# Patient Record
Sex: MALE | Race: WHITE | NOT HISPANIC OR LATINO
[De-identification: names, ages, dates, MRNs, and addresses within clinical notes are randomized per-mention and may not be internally consistent; named-entity substitution may affect disease eponyms.]

---

## 2019-12-19 ENCOUNTER — APPOINTMENT (OUTPATIENT)
Dept: OTOLARYNGOLOGY | Facility: CLINIC | Age: 64
End: 2019-12-19
Payer: MEDICARE

## 2019-12-19 VITALS
WEIGHT: 140 LBS | DIASTOLIC BLOOD PRESSURE: 68 MMHG | HEART RATE: 75 BPM | SYSTOLIC BLOOD PRESSURE: 138 MMHG | HEIGHT: 69 IN | BODY MASS INDEX: 20.73 KG/M2

## 2019-12-19 DIAGNOSIS — Z82.49 FAMILY HISTORY OF ISCHEMIC HEART DISEASE AND OTHER DISEASES OF THE CIRCULATORY SYSTEM: ICD-10-CM

## 2019-12-19 DIAGNOSIS — H60.312 DIFFUSE OTITIS EXTERNA, LEFT EAR: ICD-10-CM

## 2019-12-19 DIAGNOSIS — H61.23 IMPACTED CERUMEN, BILATERAL: ICD-10-CM

## 2019-12-19 DIAGNOSIS — Z78.9 OTHER SPECIFIED HEALTH STATUS: ICD-10-CM

## 2019-12-19 PROBLEM — Z00.00 ENCOUNTER FOR PREVENTIVE HEALTH EXAMINATION: Status: ACTIVE | Noted: 2019-12-19

## 2019-12-19 PROCEDURE — 99213 OFFICE O/P EST LOW 20 MIN: CPT | Mod: 25

## 2019-12-19 PROCEDURE — 69210 REMOVE IMPACTED EAR WAX UNI: CPT

## 2019-12-19 RX ORDER — NEOMYCIN SULFATE, POLYMYXIN B SULFATE, HYDROCORTISONE 3.5; 10000; 1 MG/ML; [USP'U]/ML; MG/ML
1 SOLUTION/ DROPS AURICULAR (OTIC)
Refills: 0 | Status: ACTIVE | COMMUNITY

## 2019-12-19 RX ORDER — NEOMYCIN SULFATE, POLYMYXIN B SULFATE AND HYDROCORTISONE 3.5; 10000; 1 MG/ML; [IU]/ML; MG/ML
3.5-10000-1 SOLUTION AURICULAR (OTIC) 4 TIMES DAILY
Qty: 1 | Refills: 1 | Status: ACTIVE | COMMUNITY
Start: 2019-12-19 | End: 1900-01-01

## 2019-12-19 NOTE — HISTORY OF PRESENT ILLNESS
[de-identified] : BETTIE BROOKS has of recurrent ear irritation with crusting itching and bleeding for several years.  Now with a history of Bleeding left Ear for 1 - 2 weeks. Reports a prior history of hearing loss.

## 2019-12-19 NOTE — ASSESSMENT
[FreeTextEntry1] : Ear hygiene reviewed in detail.  Follow up recommended if symptoms persist or progresses.  Routine follow up for cerumen management suggested.\par \par Audiometry recommended

## 2019-12-19 NOTE — PHYSICAL EXAM
[Normal] : mucosa is normal [Midline] : trachea located in midline position [FreeTextEntry1] : Microscopic ear exam with cerumen debridement:\par \par Right ear: Obstructing cerumen was debrided from the ear canal using suction, and curet.  The ear canal was otherwise within normal limits.  The tympanic membrane was intact and noninflamed.\par \par Left ear: Obstructing cerumen was debrided from the ear canal using suction, and curets.  Mild acute inflammation with mild exudate debrided with suction.. The tympanic membrane was intact and noninflamed.\par